# Patient Record
Sex: FEMALE | Race: WHITE | Employment: OTHER | URBAN - METROPOLITAN AREA
[De-identification: names, ages, dates, MRNs, and addresses within clinical notes are randomized per-mention and may not be internally consistent; named-entity substitution may affect disease eponyms.]

---

## 2018-01-30 ENCOUNTER — HOSPITAL ENCOUNTER (OUTPATIENT)
Dept: MAMMOGRAPHY | Age: 68
Discharge: HOME | End: 2018-01-30
Attending: OBSTETRICS & GYNECOLOGY
Payer: COMMERCIAL

## 2018-01-30 ENCOUNTER — HOSPITAL ENCOUNTER (OUTPATIENT)
Dept: GENERAL RADIOLOGY | Age: 68
Discharge: HOME | End: 2018-01-30
Attending: OBSTETRICS & GYNECOLOGY
Payer: COMMERCIAL

## 2018-01-30 DIAGNOSIS — Z12.31 ENCOUNTER FOR SCREENING MAMMOGRAM FOR BREAST CANCER: ICD-10-CM

## 2018-01-30 DIAGNOSIS — M81.0 OSTEOPOROSIS: ICD-10-CM

## 2018-01-30 PROCEDURE — 77067 SCR MAMMO BI INCL CAD: CPT

## 2018-02-02 ENCOUNTER — IMPORTED ENCOUNTER (OUTPATIENT)
Dept: URBAN - METROPOLITAN AREA CLINIC 1 | Facility: CLINIC | Age: 68
End: 2018-02-02

## 2018-02-02 PROBLEM — H40.053: Noted: 2018-02-02

## 2018-02-02 PROBLEM — H25.813: Noted: 2018-02-02

## 2018-02-02 PROBLEM — H40.033: Noted: 2018-02-02

## 2018-02-02 PROBLEM — H40.023: Noted: 2018-02-02

## 2018-02-02 PROCEDURE — 92014 COMPRE OPH EXAM EST PT 1/>: CPT

## 2018-02-02 PROCEDURE — 92015 DETERMINE REFRACTIVE STATE: CPT

## 2018-02-02 PROCEDURE — 92020 GONIOSCOPY: CPT

## 2018-02-02 NOTE — PATIENT DISCUSSION
1.  OHTN OU (0.3 OU)- Increased IOP OU. Start Lumigan OU QHS (sample given.) Condition discussed w/ pt.2. Narrow Angles OU w/ possible intermittent occlusion and OHTN. Condition discussed w/ pt. Consider Yag PIs or Phaco at next visit. Gonio done today- SS visualized in all quadrants. 3.  Cataract OU: Consider Yag PIs or Phaco at next visit. 4. Pt understands switch from routine to medical due to eye conditions5. Return for an appointment for a 10/glare in 3 weeks with Dr. Sheeba Sheppard.

## 2018-02-26 ENCOUNTER — IMPORTED ENCOUNTER (OUTPATIENT)
Dept: URBAN - METROPOLITAN AREA CLINIC 1 | Facility: CLINIC | Age: 68
End: 2018-02-26

## 2018-02-26 PROBLEM — D48.5: Noted: 2018-02-26

## 2018-02-26 PROBLEM — H40.023: Noted: 2018-02-26

## 2018-02-26 PROBLEM — H25.813: Noted: 2018-02-26

## 2018-02-26 PROBLEM — H16.143: Noted: 2018-02-26

## 2018-02-26 PROBLEM — H04.123: Noted: 2018-02-26

## 2018-02-26 PROBLEM — H40.033: Noted: 2018-02-26

## 2018-02-26 PROCEDURE — 92012 INTRM OPH EXAM EST PATIENT: CPT

## 2018-02-26 NOTE — PATIENT DISCUSSION
1.  Cataract OU:  Visually Significant secondary to glare discussed the risks benefits alternatives and limitations of cataract surgery. The patient stated a full understanding and a desire to proceed with the procedure. The patient will need to return for preop appointment with cataract measurements and to have any additional questions answered and start pre-operative eye drops as directed. Phaco PCL OS then OD. (Otherwise follow-up 6 mo 10 consider YAG PI) 2. Narrow Angles OU with possible intermittent occlusion - Given scheduling Phaco today will hold on YAG PI. 3.  YULIA w/ improved PEK OU- Cont ATs TID OU routinely. 4.  OHTN/Glaucoma Suspect OU : (CD 0.3 OU) Neg Fm Hx. IOP improved on Lumigan. W/u after Phaco. Cont Lumigan QHS OU. 5.  LLL Mass of uncertain behavior- Plan excisional biopsy with pathology. Return for an appointment with Dr. Kedar Schmidt for AS/HP.

## 2018-03-07 ENCOUNTER — IMPORTED ENCOUNTER (OUTPATIENT)
Dept: URBAN - METROPOLITAN AREA CLINIC 1 | Facility: CLINIC | Age: 68
End: 2018-03-07

## 2018-03-07 PROCEDURE — 67840 REMOVE EYELID LESION: CPT

## 2018-03-07 NOTE — PATIENT DISCUSSION
Excisonal biopsy of mass on left lower lid: After proper informed consent was obtained the patient was taken to the operating room and placed supine on the operating table. The left eye was then prepped in the standard surgical fashion. Attention was then turned to the left lower eyelid where one percent Xylocaine with Epinephrine was infiltrated under the lesion. A scalpel was then used to make an elliptical incision around the lesion and Nguyen scissors were used to excise the mass free. The mass was passed off the table as a specimen and sent for pathology. Cautery was used for hemostasis and ointment was applied. The wound was small and therefore left to heal by secondary intention. The patient tolerated the procedure well and there were no complications. The patient was instructed to use Maxitrol maribel BID x 1 week and return to clinic as scheduled.

## 2018-03-09 PROBLEM — D48.5: Noted: 2018-03-09

## 2018-04-02 ENCOUNTER — IMPORTED ENCOUNTER (OUTPATIENT)
Dept: URBAN - METROPOLITAN AREA CLINIC 1 | Facility: CLINIC | Age: 68
End: 2018-04-02

## 2018-04-02 PROBLEM — H25.812: Noted: 2018-04-02

## 2018-04-02 PROCEDURE — 92136 OPHTHALMIC BIOMETRY: CPT

## 2018-04-02 NOTE — PATIENT DISCUSSION
1. Cataract OS:  Visually Significant secondary to glare discussed the risks benefits alternatives and limitations of cataract surgery. The patient stated a full understanding and a desire to proceed with the procedure. Phaco PCL OS (Toric lens standard procedure) Return for an appointment for Return as scheduled with Dr. Bernice Brandt.

## 2018-04-11 ENCOUNTER — IMPORTED ENCOUNTER (OUTPATIENT)
Dept: URBAN - METROPOLITAN AREA CLINIC 1 | Facility: CLINIC | Age: 68
End: 2018-04-11

## 2018-04-11 PROBLEM — H25.812 COMBINED FORM OF SENILE CATARACT OF LEFT EYE: Status: ACTIVE | Noted: 2018-04-11

## 2018-04-11 PROBLEM — H25.812 COMBINED FORM OF SENILE CATARACT OF LEFT EYE: Status: RESOLVED | Noted: 2018-04-11 | Resolved: 2018-04-11

## 2018-04-12 ENCOUNTER — IMPORTED ENCOUNTER (OUTPATIENT)
Dept: URBAN - METROPOLITAN AREA CLINIC 1 | Facility: CLINIC | Age: 68
End: 2018-04-12

## 2018-04-12 PROBLEM — Z96.1: Noted: 2018-04-12

## 2018-04-12 PROCEDURE — 99024 POSTOP FOLLOW-UP VISIT: CPT

## 2018-04-12 NOTE — PATIENT DISCUSSION
POD#1 CE/IOL Toric OS doing well. Switch Lumigan OS to Combigan OS BIDContinue Lumigan OD QHSContinue all 3 gtts as prescribed and until gone. Ocuflox TIDLotemax BIDProlensa QDPost op Warnings Reiterated RTC as scheduled

## 2018-04-17 ENCOUNTER — IMPORTED ENCOUNTER (OUTPATIENT)
Dept: URBAN - METROPOLITAN AREA CLINIC 1 | Facility: CLINIC | Age: 68
End: 2018-04-17

## 2018-04-17 PROBLEM — H25.811: Noted: 2018-04-17

## 2018-04-17 PROCEDURE — 92136 OPHTHALMIC BIOMETRY: CPT

## 2018-04-17 NOTE — PATIENT DISCUSSION
1.  1.  Cataract OD: Visually Significant secondary to glare discussed the risks benefits alternatives and limitations of cataract surgery. The patient stated a full understanding and a desire to proceed with the procedure. Pt understands they will need glasses post-op to achieve their best corrected vision. Phaco PCL OD 2. POW#2  CE/IOL OS doing well. continue Combigan OS BID and Lumigan OD QHSF/u as scheduled 2nd eye 2. Return for an appointment for Return as scheduled with Dr. Mary Ann Martins.

## 2018-04-25 ENCOUNTER — IMPORTED ENCOUNTER (OUTPATIENT)
Dept: URBAN - METROPOLITAN AREA CLINIC 1 | Facility: CLINIC | Age: 68
End: 2018-04-25

## 2018-04-25 PROBLEM — H25.811 COMBINED FORM OF SENILE CATARACT OF RIGHT EYE: Status: RESOLVED | Noted: 2018-04-25 | Resolved: 2018-04-25

## 2018-04-25 PROBLEM — H25.811 COMBINED FORM OF SENILE CATARACT OF RIGHT EYE: Status: ACTIVE | Noted: 2018-04-25

## 2018-04-26 ENCOUNTER — IMPORTED ENCOUNTER (OUTPATIENT)
Dept: URBAN - METROPOLITAN AREA CLINIC 1 | Facility: CLINIC | Age: 68
End: 2018-04-26

## 2018-04-26 PROBLEM — Z96.1: Noted: 2018-04-26

## 2018-04-26 PROCEDURE — 99024 POSTOP FOLLOW-UP VISIT: CPT

## 2018-04-26 NOTE — PATIENT DISCUSSION
POW#1  CE/IOL OS doing well. Discontinue OcufloxContinue Lotemax/Durezol/Prednisolone BID until gone. Continue Prolensa/Ilevro/Acular QD until gone.

## 2018-04-26 NOTE — PATIENT DISCUSSION
1. POD#1 CE/IOL ZCT Toric OD doing well. D/C Lumgian. Continue Combigan OU BID (Samples given. )Continue all 3 gtts as prescribed and until gone. Ocuflox TIDLotemax BIDProlensa QDPost op Warnings Reiterated 2. POW#2  CE/IOL ZCT Toric OS doing well. Continue Lotemax BID until gone. Continue Prolensa QD until gone. 3.   RTC as scheduled

## 2018-05-25 ENCOUNTER — IMPORTED ENCOUNTER (OUTPATIENT)
Dept: URBAN - METROPOLITAN AREA CLINIC 1 | Facility: CLINIC | Age: 68
End: 2018-05-25

## 2018-05-25 PROBLEM — Z96.1: Noted: 2018-05-25

## 2018-05-25 PROCEDURE — 99024 POSTOP FOLLOW-UP VISIT: CPT

## 2018-05-25 NOTE — PATIENT DISCUSSION
POM#1 CE/IOL ZCT Toric OU doing well. Stable IOP off drops. Continue Tears OU BID. Restart Prolensa OS QD until gone. Return for an appointment for a 30/OCT in 6 months with Dr. Darek Tam.

## 2018-07-09 ENCOUNTER — IMPORTED ENCOUNTER (OUTPATIENT)
Dept: URBAN - METROPOLITAN AREA CLINIC 1 | Facility: CLINIC | Age: 68
End: 2018-07-09

## 2018-07-09 PROBLEM — H04.123: Noted: 2018-07-09

## 2018-07-09 PROBLEM — Z96.1: Noted: 2018-07-09

## 2018-07-09 PROBLEM — H10.45: Noted: 2018-07-09

## 2018-07-09 PROBLEM — H16.143: Noted: 2018-07-09

## 2018-07-09 PROCEDURE — 92012 INTRM OPH EXAM EST PATIENT: CPT

## 2018-07-09 NOTE — PATIENT DISCUSSION
1.  YULIA w/ PEK OU- Use PF ATs TID OU Routinely (Sample of PF Optive given) 2. Allergic Conjunctivitis OU- observe. Ok to d/c Sudhir's since patient felt worse using Zaditor. 3.  Pseudophakia OU - (Toric OU) 4. OHTN OU (CD 0.3 OU)- IOP stable on no gtts. Cont to observe off Lumigan.  F/u as scheduled

## 2018-07-09 NOTE — PATIENT DISCUSSION
Allergic Conjunctivitis OU :  Condition discussed with patient. Advised patient to use OTC Zaditor one drop OU BID prn.

## 2018-11-29 ENCOUNTER — HOSPITAL ENCOUNTER (OUTPATIENT)
Dept: GENERAL RADIOLOGY | Age: 68
Discharge: HOME | End: 2018-11-29
Attending: OBSTETRICS & GYNECOLOGY
Payer: COMMERCIAL

## 2018-11-29 PROCEDURE — 77080 DXA BONE DENSITY AXIAL: CPT

## 2018-11-30 ENCOUNTER — IMPORTED ENCOUNTER (OUTPATIENT)
Dept: URBAN - METROPOLITAN AREA CLINIC 1 | Facility: CLINIC | Age: 68
End: 2018-11-30

## 2018-11-30 PROBLEM — H40.013: Noted: 2018-11-30

## 2018-11-30 PROBLEM — H16.143: Noted: 2018-11-30

## 2018-11-30 PROBLEM — H04.123: Noted: 2018-11-30

## 2018-11-30 PROCEDURE — 92014 COMPRE OPH EXAM EST PT 1/>: CPT

## 2018-11-30 PROCEDURE — 92133 CPTRZD OPH DX IMG PST SGM ON: CPT

## 2018-11-30 NOTE — PATIENT DISCUSSION
1.  Glaucoma Suspect OU/ OHTN (CD 0.8 OU) Neg Fm Hx. OCT WNL OU Today. IOP stable off gtts. Cont to observe. 2.  YULIA w/ PEK OU- Cont PF ATs TID OU Routinely  3. Allergic Conjunctivitis OU- observe. 4.  Pseudophakia OU - (Toric OU) Letter to PCP Return for an appointment in 1 year 30 glare with Dr. Chayo Chawla.

## 2019-04-23 ENCOUNTER — HOSPITAL ENCOUNTER (OUTPATIENT)
Dept: MAMMOGRAPHY | Age: 69
Discharge: HOME | End: 2019-04-23
Attending: OBSTETRICS & GYNECOLOGY
Payer: COMMERCIAL

## 2019-04-23 DIAGNOSIS — Z12.31 VISIT FOR SCREENING MAMMOGRAM: ICD-10-CM

## 2019-04-23 PROCEDURE — 77063 BREAST TOMOSYNTHESIS BI: CPT

## 2019-07-18 NOTE — PATIENT DISCUSSION
Recommend Bilateral lower lid blepharoplasty trans conj no laser (discussed risks and benefits of sx. .. ).

## 2019-07-26 NOTE — PATIENT DISCUSSION
This visual field clearly demonstrated a minimum of 53% loss of upper field of vision OU, with upper lid skin in repose and elevated by taping of the lid to demonstrate potential correction. This field shows that taping the lids significantly improved this patient's superior field of vision by approximately 51%, OU.

## 2019-10-22 NOTE — PROCEDURE NOTE: SURGICAL
"<span style=""font-weight: bold;"">MR #:&nbsp;</span>&nbsp;[id number]<br /> <br /> <span style=""font-weight: bold;"">PREOPERATIVE DIAGNOSIS:</span>&nbsp;Dermatochalasis upper lids<br /> <br /> <span style=""font-weight: bold;"">POSTOPERATIVE DIAGNOSIS:</span>&nbsp;Same<br /> <br /> <span style=""font-weight: bold;"">OPERATIVE PROCEDURE:</span>&nbsp; Upper lid blepharoplasty both eyes<br /> <br /> <span style=""font-weight: bold;"">ANESTHESIA: &nbsp;</span> &nbsp;&nbsp; Local MAC<br /> <br /> <span style=""font-weight: bold;"">ESTIMATED BLOOD LOSS:</span> &nbsp;Minimal

## 2019-12-03 ENCOUNTER — IMPORTED ENCOUNTER (OUTPATIENT)
Dept: URBAN - METROPOLITAN AREA CLINIC 1 | Facility: CLINIC | Age: 69
End: 2019-12-03

## 2019-12-03 PROBLEM — H26.493: Noted: 2019-12-03

## 2019-12-03 PROBLEM — H10.45: Noted: 2019-12-03

## 2019-12-03 PROBLEM — Z96.1: Noted: 2019-12-03

## 2019-12-03 PROBLEM — H40.013: Noted: 2019-12-03

## 2019-12-03 PROBLEM — H04.123: Noted: 2019-12-03

## 2019-12-03 PROBLEM — H16.143: Noted: 2019-12-03

## 2019-12-03 PROCEDURE — 92014 COMPRE OPH EXAM EST PT 1/>: CPT

## 2019-12-03 NOTE — PATIENT DISCUSSION
Glaucoma Suspect OU/OHTN (CD 0.30 OU): Past w/u negative. IOP stable. Negative family hx. Patient is considered Low Risk. Condition was discussed with patient and patient understands. Will continue to monitor patient for any progression in condition. Patient was advised to call us with any problems questions or concerns.

## 2019-12-03 NOTE — PATIENT DISCUSSION
1.  YULIA w/ PEK OU- Recommend ATs TID OU routinely 2. PCO OU: (Posterior Capsule Opacification)   Observe and consider yag cap when pt feels pco visually significant and visual acuity decreases to appropriate level. 3. Pseudophakia OU - (Toric OU)  Consider Alphagan OU PRN 4. Allergic Conjunctivitis OU - Avoidance of allergens and cool compresses were recommended. 5. Glaucoma Suspect OU/OHTN (CD 0.30 OU): Past w/u negative. IOP stable. Negative family hx. Patient is considered Low Risk. Patient deferred Manifest Rx today. Return for an appointment in 1 year 27 with Dr. Ubaldo Erwin.

## 2021-03-30 ENCOUNTER — IMPORTED ENCOUNTER (OUTPATIENT)
Dept: URBAN - METROPOLITAN AREA CLINIC 1 | Facility: CLINIC | Age: 71
End: 2021-03-30

## 2021-03-30 PROBLEM — H04.123: Noted: 2021-03-30

## 2021-03-30 PROBLEM — H16.143: Noted: 2021-03-30

## 2021-03-30 PROBLEM — Z96.1: Noted: 2021-03-30

## 2021-03-30 PROBLEM — H26.493: Noted: 2021-03-30

## 2021-03-30 PROCEDURE — 92015 DETERMINE REFRACTIVE STATE: CPT

## 2021-03-30 PROCEDURE — 99214 OFFICE O/P EST MOD 30 MIN: CPT

## 2021-03-30 NOTE — PATIENT DISCUSSION
1.  YULIA w/ PEK OU- Recommend ATs TID OU routinely 2. PCO OU- Visually Significant *secondary to glare*; schedule YAG Cap. Pros cons risks and benefits. 3.  Pseudophakia OU - (Toric OU)  Consider Alphagan OU PRN 4. Allergic Conjunctivitis OU - Avoidance of allergens and cool compresses were recommended. 5. Glaucoma Suspect OU/OHTN (CD 0.30 OU): Past w/u negative. IOP stable. Negative family hx. Patient is considered Low Risk. Return for an appointment in YAG Cap OD then OS with Dr. Alex Barnes.

## 2021-05-10 ENCOUNTER — IMPORTED ENCOUNTER (OUTPATIENT)
Dept: URBAN - METROPOLITAN AREA CLINIC 1 | Facility: CLINIC | Age: 71
End: 2021-05-10

## 2021-05-10 PROBLEM — H26.491: Noted: 2021-05-10

## 2021-05-10 PROCEDURE — 66821 AFTER CATARACT LASER SURGERY: CPT

## 2021-05-17 ENCOUNTER — IMPORTED ENCOUNTER (OUTPATIENT)
Dept: URBAN - METROPOLITAN AREA CLINIC 1 | Facility: CLINIC | Age: 71
End: 2021-05-17

## 2021-05-17 PROBLEM — H26.492: Noted: 2021-05-17

## 2021-05-17 PROCEDURE — 66821 AFTER CATARACT LASER SURGERY: CPT

## 2022-04-03 ASSESSMENT — TONOMETRY
OD_IOP_MMHG: 15
OD_IOP_MMHG: 13
OD_IOP_MMHG: 14
OS_IOP_MMHG: 16
OD_IOP_MMHG: 16
OD_IOP_MMHG: 15
OD_IOP_MMHG: 15
OS_IOP_MMHG: 17
OS_IOP_MMHG: 15
OS_IOP_MMHG: 15
OS_IOP_MMHG: 10
OD_IOP_MMHG: 15
OD_IOP_MMHG: 15
OD_IOP_MMHG: 16
OS_IOP_MMHG: 31
OS_IOP_MMHG: 16
OS_IOP_MMHG: 15
OD_IOP_MMHG: 24
OS_IOP_MMHG: 18
OS_IOP_MMHG: 19

## 2022-04-03 ASSESSMENT — VISUAL ACUITY
OS_GLARE: 20/50
OD_GLARE: 20/60
OD_GLARE: 20/50
OS_CC: 20/30
OD_CC: 20/20
OD_CC: 20/20-1
OS_GLARE: 20/50
OD_SC: 20/20
OS_CC: 20/20
OD_CC: 20/20
OS_SC: 20/20
OD_CC: 20/30-1
OS_CC: 20/25-1
OD_GLARE: 20/50
OS_CC: 20/25-1
OD_CC: 20/25
OS_GLARE: 20/60
OS_CC: 20/30+2
OS_CC: 20/25+1
OS_CC: 20/25-2
OD_CC: 20/30-2
OS_CC: 20/20-2
OD_CC: 20/20
OS_CC: 20/20

## 2022-05-12 ENCOUNTER — ESTABLISHED PATIENT (OUTPATIENT)
Dept: URBAN - METROPOLITAN AREA CLINIC 1 | Facility: CLINIC | Age: 72
End: 2022-05-12

## 2022-05-12 DIAGNOSIS — Z96.1: ICD-10-CM

## 2022-05-12 DIAGNOSIS — H16.143: ICD-10-CM

## 2022-05-12 DIAGNOSIS — H10.45: ICD-10-CM

## 2022-05-12 DIAGNOSIS — H01.021: ICD-10-CM

## 2022-05-12 DIAGNOSIS — H01.024: ICD-10-CM

## 2022-05-12 DIAGNOSIS — H04.123: ICD-10-CM

## 2022-05-12 PROCEDURE — 92015 DETERMINE REFRACTIVE STATE: CPT

## 2022-05-12 PROCEDURE — 99214 OFFICE O/P EST MOD 30 MIN: CPT

## 2022-05-12 ASSESSMENT — VISUAL ACUITY
OS_SC: 20/20
OS_SC: J2
OD_SC: J2
OD_SC: 20/25

## 2022-05-12 ASSESSMENT — TONOMETRY
OS_IOP_MMHG: 15
OD_IOP_MMHG: 15

## 2023-05-15 ENCOUNTER — COMPREHENSIVE EXAM (OUTPATIENT)
Dept: URBAN - METROPOLITAN AREA CLINIC 1 | Facility: CLINIC | Age: 73
End: 2023-05-15

## 2023-05-15 DIAGNOSIS — H16.143: ICD-10-CM

## 2023-05-15 DIAGNOSIS — H01.024: ICD-10-CM

## 2023-05-15 DIAGNOSIS — H04.123: ICD-10-CM

## 2023-05-15 DIAGNOSIS — H01.021: ICD-10-CM

## 2023-05-15 DIAGNOSIS — H10.45: ICD-10-CM

## 2023-05-15 PROCEDURE — 92014 COMPRE OPH EXAM EST PT 1/>: CPT

## 2023-05-15 ASSESSMENT — TONOMETRY
OD_IOP_MMHG: 15
OS_IOP_MMHG: 15

## 2023-05-15 ASSESSMENT — VISUAL ACUITY
OD_SC: 20/20
OS_SC: 20/20

## 2024-05-20 ENCOUNTER — COMPREHENSIVE EXAM (OUTPATIENT)
Dept: URBAN - METROPOLITAN AREA CLINIC 1 | Facility: CLINIC | Age: 74
End: 2024-05-20

## 2024-05-20 DIAGNOSIS — H10.45: ICD-10-CM

## 2024-05-20 DIAGNOSIS — H01.021: ICD-10-CM

## 2024-05-20 DIAGNOSIS — H16.143: ICD-10-CM

## 2024-05-20 DIAGNOSIS — H43.812: ICD-10-CM

## 2024-05-20 DIAGNOSIS — H01.024: ICD-10-CM

## 2024-05-20 DIAGNOSIS — H04.123: ICD-10-CM

## 2024-05-20 PROCEDURE — 99214 OFFICE O/P EST MOD 30 MIN: CPT

## 2024-05-20 ASSESSMENT — VISUAL ACUITY
OD_SC: J2
OS_SC: J2
OS_SC: 20/25+2
OD_SC: 20/20-2

## 2024-05-20 ASSESSMENT — TONOMETRY
OS_IOP_MMHG: 18
OD_IOP_MMHG: 18

## 2025-05-23 ENCOUNTER — COMPREHENSIVE EXAM (OUTPATIENT)
Age: 75
End: 2025-05-23

## 2025-05-23 DIAGNOSIS — H10.45: ICD-10-CM

## 2025-05-23 DIAGNOSIS — Z96.1: ICD-10-CM

## 2025-05-23 DIAGNOSIS — H04.123: ICD-10-CM

## 2025-05-23 DIAGNOSIS — H01.024: ICD-10-CM

## 2025-05-23 DIAGNOSIS — H01.021: ICD-10-CM

## 2025-05-23 DIAGNOSIS — H43.812: ICD-10-CM

## 2025-05-23 DIAGNOSIS — H16.143: ICD-10-CM

## 2025-05-23 PROCEDURE — 99214 OFFICE O/P EST MOD 30 MIN: CPT
